# Patient Record
Sex: FEMALE | Race: BLACK OR AFRICAN AMERICAN | Employment: OTHER | URBAN - METROPOLITAN AREA
[De-identification: names, ages, dates, MRNs, and addresses within clinical notes are randomized per-mention and may not be internally consistent; named-entity substitution may affect disease eponyms.]

---

## 2018-11-13 ENCOUNTER — HOSPITAL ENCOUNTER (EMERGENCY)
Age: 83
Discharge: HOME | End: 2018-11-13
Attending: EMERGENCY MEDICINE
Payer: MEDICARE

## 2018-11-13 VITALS
OXYGEN SATURATION: 97 % | SYSTOLIC BLOOD PRESSURE: 139 MMHG | DIASTOLIC BLOOD PRESSURE: 70 MMHG | HEART RATE: 61 BPM | RESPIRATION RATE: 15 BRPM | WEIGHT: 176.59 LBS | BODY MASS INDEX: 32.5 KG/M2 | HEIGHT: 62 IN | TEMPERATURE: 97.9 F

## 2018-11-13 DIAGNOSIS — H15.002 SCLERITIS OF LEFT EYE: ICD-10-CM

## 2018-11-13 DIAGNOSIS — H57.12 ACUTE LEFT EYE PAIN: Primary | ICD-10-CM

## 2018-11-13 PROCEDURE — 74011000250 HC RX REV CODE- 250

## 2018-11-13 PROCEDURE — 99282 EMERGENCY DEPT VISIT SF MDM: CPT

## 2018-11-13 RX ORDER — TETRACAINE HYDROCHLORIDE 5 MG/ML
2 SOLUTION OPHTHALMIC
Status: COMPLETED | OUTPATIENT
Start: 2018-11-13 | End: 2018-11-13

## 2018-11-13 RX ORDER — TETRACAINE HYDROCHLORIDE 5 MG/ML
SOLUTION OPHTHALMIC
Status: COMPLETED
Start: 2018-11-13 | End: 2018-11-13

## 2018-11-13 RX ORDER — PREDNISONE 10 MG/1
TABLET ORAL
Qty: 21 TAB | Refills: 0 | Status: SHIPPED | OUTPATIENT
Start: 2018-11-13 | End: 2022-01-09 | Stop reason: ALTCHOICE

## 2018-11-13 RX ORDER — OXYCODONE AND ACETAMINOPHEN 5; 325 MG/1; MG/1
1 TABLET ORAL
Qty: 20 TAB | Refills: 0 | Status: SHIPPED | OUTPATIENT
Start: 2018-11-13 | End: 2021-06-25

## 2018-11-13 RX ORDER — GENTAMICIN SULFATE 3 MG/ML
2 SOLUTION/ DROPS OPHTHALMIC EVERY 4 HOURS
Qty: 5 ML | Refills: 0 | Status: SHIPPED | OUTPATIENT
Start: 2018-11-13 | End: 2018-11-20

## 2018-11-13 RX ADMIN — TETRACAINE HYDROCHLORIDE 2 DROP: 5 SOLUTION OPHTHALMIC at 07:22

## 2018-11-13 RX ADMIN — FLUORESCEIN SODIUM 1 STRIP: 0.6 STRIP OPHTHALMIC at 07:23

## 2018-11-13 NOTE — DISCHARGE INSTRUCTIONS
Scleritis: Care Instructions  Your Care Instructions    The sclera is the white of the eye. It is a fibrous covering that makes up much of the globe of the eye. The sclera protects the eye's inner parts. Scleritis means that the sclera is inflamed. The inflammation is what makes the white of the eye look red, or sometimes purple. Pain from scleritis is usually severe and is worse at night. The pain can spread to your face and jaw. Scleritis can make your eyes sensitive to light. It can also affect your vision. In more severe cases, it can lead to loss of vision. Scleritis is often caused by another medical problem, such as rheumatoid arthritis. Sometimes an eye infection is the cause. To find out if you have scleritis, your doctor checks your vision. He or she carefully examines your eyes. Your doctor also asks about other symptoms like joint pain or fevers. You may have tests and X-rays to look for medical problems that can cause scleritis. When scleritis is in the back of the eye, it can be harder to diagnose. Treatment focuses on reducing the inflammation. Your doctor may give you a nonsteroidal anti-inflammatory drug (NSAID). If the problem is severe, a steroid medicine may help. You may also need medicine to treat the cause, such as an antibiotic for infection or medicine for immune system problems. With treatment, scleritis can sometimes go away in a few weeks. But it can last longer, even years. Follow-up care is a key part of your treatment and safety. Be sure to make and go to all appointments, and call your doctor if you are having problems. It's also a good idea to know your test results and keep a list of the medicines you take. How can you care for yourself at home? · Be safe with medicines. Use your medicines exactly as prescribed. Call your doctor if you think you are having a problem with your medicine.   · If you are using eyedrops or ointment, be sure the dropper or bottle tip is clean. To put in eyedrops or ointment:  ? Tilt your head back, and pull your lower eyelid down with one finger. ? Drop or squirt the medicine inside the lower lid. ? Close your eye for 30 to 60 seconds to let the drops or ointment move around. ? Do not touch the ointment or dropper tip to your eyelashes or any other surface. · Do not wear contact lenses until your doctor tells you it's safe to use them again. · Wear sunglasses to help relieve pain from bright light. · See your doctor for checkups as often as scheduled. When should you call for help? Call your doctor now or seek immediate medical care if:    · You notice a loss of your vision.     · You have vision changes.     · You have new or worse eye pain.     · You have new or worse redness in your eye.    Watch closely for changes in your health, and be sure to contact your doctor if:    · You do not get better as expected. Where can you learn more? Go to http://helen-ebenezer.info/. Enter P368 in the search box to learn more about \"Scleritis: Care Instructions. \"  Current as of: December 3, 2017  Content Version: 11.8  © 4817-2471 [employer]. Care instructions adapted under license by [employer] (which disclaims liability or warranty for this information). If you have questions about a medical condition or this instruction, always ask your healthcare professional. [place] [address] any warranty or liability for your use of this information.

## 2018-11-13 NOTE — ED PROVIDER NOTES
The history is provided by the patient. Eye Swelling This is a new problem. Episode onset: last night - worse this morning. The left eye is affected. The injury mechanism is unknown. The pain is at a severity of 10/10. Associated symptoms include photophobia, eye redness and pain. Pertinent negatives include no nausea, no vomiting and no fever. She has tried nothing for the symptoms. Past Medical History:  
Diagnosis Date  Hyperlipidemia  Hypertension No past surgical history on file. No family history on file. Social History Socioeconomic History  Marital status:  Spouse name: Not on file  Number of children: Not on file  Years of education: Not on file  Highest education level: Not on file Social Needs  Financial resource strain: Not on file  Food insecurity - worry: Not on file  Food insecurity - inability: Not on file  Transportation needs - medical: Not on file  Transportation needs - non-medical: Not on file Occupational History  Not on file Tobacco Use  Smoking status: Not on file Substance and Sexual Activity  Alcohol use: Not on file  Drug use: Not on file  Sexual activity: Not on file Other Topics Concern  Not on file Social History Narrative  Not on file ALLERGIES: Patient has no known allergies. Review of Systems Constitutional: Negative for chills and fever. HENT: Positive for facial swelling. Negative for ear pain and sore throat. Eyes: Positive for photophobia, pain and redness. Gastrointestinal: Negative for nausea and vomiting. Neurological: Negative for headaches. Vitals:  
 11/13/18 9253 BP: 137/74 Pulse: 67 Resp: 18 Temp: 97.9 °F (36.6 °C) SpO2: 97% Weight: 80.1 kg (176 lb 9.4 oz) Height: 5' 2\" (1.575 m) Physical Exam  
Constitutional: She is oriented to person, place, and time. She appears well-developed and well-nourished.   
HENT:  
 Head: Normocephalic and atraumatic. Eyes: EOM are normal. Pupils are equal, round, and reactive to light. Right conjunctiva is not injected. Right conjunctiva has no hemorrhage. Left conjunctiva is injected. Left conjunctiva has no hemorrhage. Pulmonary/Chest: Effort normal. No respiratory distress. Neurological: She is alert and oriented to person, place, and time. Psychiatric: She has a normal mood and affect. Nursing note and vitals reviewed. MDM Number of Diagnoses or Management Options Acute left eye pain:  
Scleritis of left eye:  
Diagnosis management comments: Left eye pain - possible occult corneal abrasion vs conjunctivitis vs scleritis or episcleritis 0730 - plan is d/c to have patient see eye doctor as soon as possible, will treat with ABX eye drops, analgesics, and steroids for suspected scleritis Eye Stain 
 
Date/Time: 11/13/2018 7:30 AM 
 
Performed by: attending Corneal abrasion was not present on eyelid eversion. Cornea is clear. Anterior chamber is clear. Patient tolerance: Patient tolerated the procedure well with no immediate complications My total time at bedside, performing this procedure was 1-15 minutes. Comments: Extensive injection of the sclera

## 2018-11-13 NOTE — ED NOTES
Dr. Hal Reilly at bedside to perform ophthalmic examination. Patient in no distress. Left eye continues tearing and patient continues to complain of pain. Family at bedside. Lights dimmed for comfort.

## 2018-11-13 NOTE — ED TRIAGE NOTES
Patient states that she woke up with a swollen left eye. Patient states that she took 25 mg of Benadryl and put eye drops that she got from a previous episode of eye swelling in the other eye. Patient does not know if they were antibiotic drops. Patient states that she is light sensitive. Left eye appears red and is tearing up. Patient holding left eye during triage.

## 2021-06-25 ENCOUNTER — HOSPITAL ENCOUNTER (EMERGENCY)
Age: 86
Discharge: HOME | End: 2021-06-25
Attending: EMERGENCY MEDICINE
Payer: MEDICARE

## 2021-06-25 ENCOUNTER — APPOINTMENT (OUTPATIENT)
Dept: GENERAL RADIOLOGY | Age: 86
End: 2021-06-25
Attending: EMERGENCY MEDICINE
Payer: MEDICARE

## 2021-06-25 VITALS
OXYGEN SATURATION: 96 % | HEART RATE: 56 BPM | RESPIRATION RATE: 16 BRPM | DIASTOLIC BLOOD PRESSURE: 60 MMHG | TEMPERATURE: 96.9 F | SYSTOLIC BLOOD PRESSURE: 140 MMHG

## 2021-06-25 DIAGNOSIS — S93.409A SPRAIN OF ANKLE, UNSPECIFIED LATERALITY, UNSPECIFIED LIGAMENT, INITIAL ENCOUNTER: Primary | ICD-10-CM

## 2021-06-25 PROCEDURE — 99284 EMERGENCY DEPT VISIT MOD MDM: CPT

## 2021-06-25 PROCEDURE — 73610 X-RAY EXAM OF ANKLE: CPT

## 2021-06-25 NOTE — ED NOTES
Pt was discharged and given instructions by Dr Blaire Vasquez. Pt verbalized good understanding of all discharge instructions, and F/U care. All questions answered. Pt in stable condition on discharge. Pt wheeled to daughter in laws car.

## 2021-06-25 NOTE — ED PROVIDER NOTES
HPI the patient complains of left ankle pain. She thinks she injured it 2 days ago while stepping off a curb. She denies any other injury. Past Medical History:   Diagnosis Date    Hyperlipidemia     Hypertension        No past surgical history on file. History reviewed. No pertinent family history. Social History     Socioeconomic History    Marital status:      Spouse name: Not on file    Number of children: Not on file    Years of education: Not on file    Highest education level: Not on file   Occupational History    Not on file   Tobacco Use    Smoking status: Not on file   Substance and Sexual Activity    Alcohol use: Not on file    Drug use: Not on file    Sexual activity: Not on file   Other Topics Concern    Not on file   Social History Narrative    Not on file     Social Determinants of Health     Financial Resource Strain:     Difficulty of Paying Living Expenses:    Food Insecurity:     Worried About Running Out of Food in the Last Year:     [address] in the Last Year:    Transportation Needs:     Lack of Transportation (Medical):  Lack of Transportation (Non-Medical):    Physical Activity:     Days of Exercise per Week:     Minutes of Exercise per Session:    Stress:     Feeling of Stress :    Social Connections:     Frequency of Communication with Friends and Family:     Frequency of Social Gatherings with Friends and Family:     Attends Sikhism Services:     Active Member of Clubs or Organizations:     Attends Club or Organization Meetings:     Marital Status:    Intimate Partner Violence:     Fear of Current or Ex-Partner:     Emotionally Abused:     Physically Abused:     Sexually Abused: ALLERGIES: Patient has no known allergies. Review of Systems   Musculoskeletal: Positive for arthralgias. There were no vitals filed for this visit. Physical Exam  Constitutional:       General: She is not in acute distress. Appearance: She is well-developed. HENT:      Head: Normocephalic. Cardiovascular:      Rate and Rhythm: Normal rate. Pulmonary:      Effort: Pulmonary effort is normal.   Musculoskeletal:         General: Normal range of motion. Cervical back: Normal range of motion. Comments: There is mild swelling and tenderness of the lateral malleolus. The medial malleolus/foot/leg are all nontender. Distal pulses intact. Skin:     General: Skin is warm and dry. Capillary Refill: Capillary refill takes less than 2 seconds. Neurological:      Mental Status: She is alert.    Psychiatric:         Behavior: Behavior normal.          MDM       Procedures

## 2021-06-25 NOTE — ED TRIAGE NOTES
Pt was wheeled to the treatment area. Pt states \"My left ankle has been hurting since I hurt it a few days ago. It hurts more at night when I lay down. \" No bruising no swelling at this time.

## 2021-11-09 ENCOUNTER — TRANSCRIBE ORDER (OUTPATIENT)
Dept: SCHEDULING | Age: 86
End: 2021-11-09

## 2021-11-09 DIAGNOSIS — M54.16 LUMBAR RADICULOPATHY: Primary | ICD-10-CM

## 2021-11-19 ENCOUNTER — HOSPITAL ENCOUNTER (OUTPATIENT)
Dept: MRI IMAGING | Age: 86
Discharge: HOME | End: 2021-11-19
Attending: FAMILY MEDICINE
Payer: MEDICARE

## 2021-11-19 DIAGNOSIS — M54.16 LUMBAR RADICULOPATHY: ICD-10-CM

## 2021-11-19 PROCEDURE — 72148 MRI LUMBAR SPINE W/O DYE: CPT

## 2022-01-09 ENCOUNTER — APPOINTMENT (OUTPATIENT)
Dept: GENERAL RADIOLOGY | Age: 87
End: 2022-01-09
Attending: EMERGENCY MEDICINE
Payer: MEDICARE

## 2022-01-09 ENCOUNTER — HOSPITAL ENCOUNTER (EMERGENCY)
Age: 87
Discharge: HOME | End: 2022-01-09
Attending: EMERGENCY MEDICINE
Payer: MEDICARE

## 2022-01-09 VITALS
WEIGHT: 162 LBS | TEMPERATURE: 99.2 F | HEART RATE: 67 BPM | DIASTOLIC BLOOD PRESSURE: 75 MMHG | SYSTOLIC BLOOD PRESSURE: 163 MMHG | OXYGEN SATURATION: 99 % | BODY MASS INDEX: 27.66 KG/M2 | HEIGHT: 64 IN | RESPIRATION RATE: 20 BRPM

## 2022-01-09 DIAGNOSIS — M54.32 SCIATICA OF LEFT SIDE: Primary | ICD-10-CM

## 2022-01-09 PROCEDURE — 99284 EMERGENCY DEPT VISIT MOD MDM: CPT

## 2022-01-09 PROCEDURE — 74011250637 HC RX REV CODE- 250/637: Performed by: EMERGENCY MEDICINE

## 2022-01-09 PROCEDURE — 72100 X-RAY EXAM L-S SPINE 2/3 VWS: CPT

## 2022-01-09 RX ORDER — PREDNISONE 20 MG/1
TABLET ORAL
Qty: 20 TABLET | Refills: 0 | Status: SHIPPED | OUTPATIENT
Start: 2022-01-09

## 2022-01-09 RX ORDER — HYDROCODONE BITARTRATE AND ACETAMINOPHEN 5; 325 MG/1; MG/1
1 TABLET ORAL
Qty: 12 TABLET | Refills: 0 | Status: SHIPPED | OUTPATIENT
Start: 2022-01-09 | End: 2022-01-12

## 2022-01-09 RX ORDER — HYDROCODONE BITARTRATE AND ACETAMINOPHEN 5; 325 MG/1; MG/1
1 TABLET ORAL
Status: COMPLETED | OUTPATIENT
Start: 2022-01-09 | End: 2022-01-09

## 2022-01-09 RX ADMIN — HYDROCODONE BITARTRATE AND ACETAMINOPHEN 1 TABLET: 5; 325 TABLET ORAL at 13:34

## 2022-01-09 NOTE — ED PROVIDER NOTES
HPI the patient has a 2-day history of left-sided low back pain that radiates into her left hip and thigh. She has used Tylenol with minimal relief. She has a history of spinal stenosis, but says it is never flared up this bad. She is ambulatory with a assistance and a walker. She denies having abdominal pain, bowel/bladder symptoms, leg weakness or other complaints. Past Medical History:   Diagnosis Date    Hyperlipidemia     Hypertension        No past surgical history on file. No family history on file. Social History     Socioeconomic History    Marital status:      Spouse name: Not on file    Number of children: Not on file    Years of education: Not on file    Highest education level: Not on file   Occupational History    Not on file   Tobacco Use    Smoking status: Not on file    Smokeless tobacco: Not on file   Substance and Sexual Activity    Alcohol use: Not on file    Drug use: Not on file    Sexual activity: Not on file   Other Topics Concern    Not on file   Social History Narrative    Not on file     Social Determinants of Health     Financial Resource Strain:     Difficulty of Paying Living Expenses: Not on file   Food Insecurity:     Worried About Running Out of Food in the Last Year: Not on file    [place] of Food in the Last Year: Not on file   Transportation Needs:     Lack of Transportation (Medical): Not on file    Lack of Transportation (Non-Medical):  Not on file   Physical Activity:     Days of Exercise per Week: Not on file    Minutes of Exercise per Session: Not on file   Stress:     Feeling of Stress : Not on file   Social Connections:     Frequency of Communication with Friends and Family: Not on file    Frequency of Social Gatherings with Friends and Family: Not on file    Attends Caodaism Services: Not on file    Active Member of Clubs or Organizations: Not on file    Attends Club or Organization Meetings: Not on file    Marital Status: Not on file   Intimate Partner Violence:     Fear of Current or Ex-Partner: Not on file    Emotionally Abused: Not on file    Physically Abused: Not on file    Sexually Abused: Not on file   Housing Stability:     Unable to Pay for Housing in the Last Year: Not on file    Number of [place] in the Last Year: Not on file    Unstable Housing in the Last Year: Not on file         ALLERGIES: Patient has no known allergies. Review of Systems   Constitutional: Negative for fever. Eyes: Negative for visual disturbance. Respiratory: Negative for cough and shortness of breath. Cardiovascular: Negative for chest pain. Gastrointestinal: Negative for abdominal pain, nausea and vomiting. Genitourinary: Negative for difficulty urinating. Musculoskeletal: Positive for back pain and myalgias. Skin: Negative for rash. Neurological: Negative for weakness and headaches. Psychiatric/Behavioral: Negative for confusion. There were no vitals filed for this visit. Physical Exam  Constitutional:       General: She is not in acute distress. Appearance: She is well-developed. HENT:      Head: Normocephalic. Cardiovascular:      Rate and Rhythm: Normal rate. Pulmonary:      Effort: Pulmonary effort is normal.      Breath sounds: Normal breath sounds. Abdominal:      Palpations: Abdomen is soft. Tenderness: There is no abdominal tenderness. Musculoskeletal:         General: Normal range of motion. Cervical back: Normal range of motion. Comments: The left leg has normal distal pulses. No motor weakness is noted. The back is nontender to palpation and she localizes the pain to the lower left lumbar area. Skin:     General: Skin is warm and dry. Capillary Refill: Capillary refill takes less than 2 seconds. Neurological:      Mental Status: She is alert.    Psychiatric:         Behavior: Behavior normal.          MDM       Procedures

## 2022-01-09 NOTE — ED TRIAGE NOTES
Pt presents via EMS with c/o left lower back, left hip and left thigh pain for the past 2 days. Able to ambulate using her walker but needing additional assistance. Denies fal or injury. Denies worsening numbness/tingling in  LLE.

## 2022-01-09 NOTE — ED NOTES
Pt is discharged pt daughter called given discharge information of prescriptions test results and follow up care needed. Pt daughter Cristela Perla verbalized good understanding and stated she or her brother would be on the way to  her mother.

## 2022-06-03 ENCOUNTER — TRANSCRIBE ORDER (OUTPATIENT)
Dept: SCHEDULING | Age: 87
End: 2022-06-03

## 2022-06-03 DIAGNOSIS — M79.605 LEFT LEG PAIN: Primary | ICD-10-CM

## 2024-03-28 ENCOUNTER — OFFICE VISIT (OUTPATIENT)
Age: 89
End: 2024-03-28
Payer: MEDICARE

## 2024-03-28 VITALS
DIASTOLIC BLOOD PRESSURE: 70 MMHG | SYSTOLIC BLOOD PRESSURE: 147 MMHG | HEART RATE: 58 BPM | WEIGHT: 170 LBS | OXYGEN SATURATION: 97 % | RESPIRATION RATE: 16 BRPM | BODY MASS INDEX: 29.18 KG/M2

## 2024-03-28 DIAGNOSIS — R41.3 MEMORY DIFFICULTIES: Primary | ICD-10-CM

## 2024-03-28 PROCEDURE — 99204 OFFICE O/P NEW MOD 45 MIN: CPT | Performed by: PSYCHIATRY & NEUROLOGY

## 2024-03-28 PROCEDURE — G8419 CALC BMI OUT NRM PARAM NOF/U: HCPCS | Performed by: PSYCHIATRY & NEUROLOGY

## 2024-03-28 PROCEDURE — 96138 PSYCL/NRPSYC TECH 1ST: CPT | Performed by: PSYCHIATRY & NEUROLOGY

## 2024-03-28 PROCEDURE — 96132 NRPSYC TST EVAL PHYS/QHP 1ST: CPT | Performed by: PSYCHIATRY & NEUROLOGY

## 2024-03-28 PROCEDURE — 1123F ACP DISCUSS/DSCN MKR DOCD: CPT | Performed by: PSYCHIATRY & NEUROLOGY

## 2024-03-28 PROCEDURE — G8428 CUR MEDS NOT DOCUMENT: HCPCS | Performed by: PSYCHIATRY & NEUROLOGY

## 2024-03-28 PROCEDURE — 1036F TOBACCO NON-USER: CPT | Performed by: PSYCHIATRY & NEUROLOGY

## 2024-03-28 PROCEDURE — 1090F PRES/ABSN URINE INCON ASSESS: CPT | Performed by: PSYCHIATRY & NEUROLOGY

## 2024-03-28 PROCEDURE — G8484 FLU IMMUNIZE NO ADMIN: HCPCS | Performed by: PSYCHIATRY & NEUROLOGY

## 2024-03-28 RX ORDER — AMLODIPINE BESYLATE 5 MG/1
5 TABLET ORAL DAILY
COMMUNITY

## 2024-03-28 RX ORDER — PROPRANOLOL HCL 60 MG
60 CAPSULE, EXTENDED RELEASE 24HR ORAL DAILY
COMMUNITY

## 2024-03-28 RX ORDER — MEMANTINE HYDROCHLORIDE 5 MG/1
5 TABLET ORAL 2 TIMES DAILY
Qty: 180 TABLET | Refills: 1 | Status: SHIPPED | OUTPATIENT
Start: 2024-03-28

## 2024-03-28 ASSESSMENT — PATIENT HEALTH QUESTIONNAIRE - PHQ9
SUM OF ALL RESPONSES TO PHQ9 QUESTIONS 1 & 2: 0
SUM OF ALL RESPONSES TO PHQ QUESTIONS 1-9: 0
1. LITTLE INTEREST OR PLEASURE IN DOING THINGS: NOT AT ALL
2. FEELING DOWN, DEPRESSED OR HOPELESS: NOT AT ALL

## 2024-03-28 NOTE — PROGRESS NOTES
[facility] at [facility] at [address] [place] [address] [address] [place] [address] [phone] Office  [phone] Facsimile           Referring: Jammie Shook MD      Chief Complaint   Patient presents with    New Patient     Presents with daughter in law today Bhumi    Memory Loss     Cognitive workup     89-year-old lady presents today accompanied by her daughter-in-law for initial neurologic consultation regarding progressive difficulty with memory.  The patient tells me she thinks she is completely fine for someone of her age.  She might be a bit forgetful.  She may forget to make her bed but then she will recall by the end of the day.  She may forget her medication but then she will recall.  She has no difficulty with bills.  She lives alone.  She does not think she repeats anything and that she did not hear people properly and she will ask a question again.  Her daughter-in-law is a bit reserved today but she does note that the patient will forget to take her meds at times.  She will also repeat herself and that has been increasing.      Past Medical History:   Diagnosis Date    Colon cancer (HCC)     Hyperlipidemia     Hypertension     Rheumatic fever        Past Surgical History:   Procedure Laterality Date    COLON SURGERY         Current Outpatient Medications   Medication Sig Dispense Refill    amLODIPine (NORVASC) 5 MG tablet Take 1 tablet by mouth daily      propranolol (INDERAL LA) 60 MG extended release capsule Take 1 capsule by mouth daily       No current facility-administered medications for this visit.        No Known Allergies    Social History     Tobacco Use    Smoking status: Never    Smokeless tobacco: Never   Vaping Use    Vaping Use: Never used   Substance Use Topics    Alcohol use: Never       Family History   Problem Relation Age

## 2024-03-28 NOTE — PATIENT INSTRUCTIONS
[place], [place] [facility] Test Result Communication    Test results are available in [employer].  [employer] is the patient portal into our electronic health record.  This feature allows patients to see diagnostic test results, immunizations, allergies, past medical and surgical history, current medications, and send messages directly to providers.  Our team members at the  can provide additional information and assist with registration.  The [employer] support team can be reached at [phone].    In some cases, a provider might need time to explain the results in detail during a follow-up appointment.  This might include additional information or context that will help patients understand the reason for next steps in the plan of care recommended by their provider.    If a patient chooses to receive diagnostic testing at an imaging center outside of the [facility] network, it is the patient's responsibility to bring the imaging report and disc to their [facility] follow-up appointment.    If the test results reveal anything that is particularly noteworthy, we will contact you to discuss the matter and, if necessary, schedule a follow-up appointment at an earlier date.    If you have not received your test results by [employer] or other communication within 7 days, please contact our office.  An inquiry can be sent to your provider using [employer].  Alternatively, appointments can be scheduled via telephone to review results.  If a follow-up appointment to review results has not been scheduled, Our [facility] office can be reached at [phone].  For appointments at our [place] or [place] office, please call [phone].       PRESCRIPTION REFILL POLICY    [facility]   Statement to Patients  April 1, 2014      In an effort to ensure the large volume of patient prescription refills is processed in the most efficient and expeditious

## 2024-03-28 NOTE — PROGRESS NOTES
07999 (16 minute minimum)  _20_ minutes were spent administering cognitive testing by medical assistant/nurse.    Cognitive Testing Evaluation     Introduction:     JASS MONZON  1933-12-25  Female   This 89 year old Female was administered a battery of neurocognitive testing on 03/28/2024.     Tests Administered:     Trails A, Trails B, Digit Symbol Substitution, Stroop, Immediate Recognition, Delayed Recognition   The combined test administration time was 17 minutes   Test Results:   Cognitive testing was provided via a battery of cognitive assessments. The pattern of test scores indicate that results are valid.  A Clinical Report with further description of scores and results is also available.   Overall: Patient tested in the 1st* percentile (standard score of 32*).   Trails A: Patient tested in the 1st percentile (scaled standard score of 44).  Trails B: Patient tested in the --* percentile (scaled standard score of null).  Digit Symbol Substitution: Patient tested in the 1st percentile (scaled standard score of 2).  Stroop: Patient tested in the 11th percentile (scaled standard score of 81).  Immediate Recognition: Patient tested in the 1st percentile (scaled standard score of 17).  Delayed Recognition: Patient tested in the 1st percentile (scaled standard score of 52).   * These assessments were not scored because they were potentially invalid, or the patient failed to complete in the allotted time.   Interpretation of Test Scores:     Examination of individual component tests shows:   Attention - Trails A: likely impairment  Mental Flexibility - Trails B: possible impairment  Executive Function - Digit Symbol Substitution: likely impairment  Executive Function - Stroop: possible impairment  Memory - Immediate Recognition: likely impairment  Memory - Delayed Recognition: likely impairment    The patient’s overall cognitive test performance was in the 1st percentile when compared to individuals of a similar

## 2024-06-21 ENCOUNTER — TRANSCRIBE ORDERS (OUTPATIENT)
Facility: HOSPITAL | Age: 89
End: 2024-06-21

## 2024-06-21 ENCOUNTER — HOSPITAL ENCOUNTER (OUTPATIENT)
Dept: VASCULAR SURGERY | Facility: HOSPITAL | Age: 89
End: 2024-06-21
Payer: MEDICARE

## 2024-06-21 DIAGNOSIS — M79.604 RIGHT LEG PAIN: ICD-10-CM

## 2024-06-21 DIAGNOSIS — M79.604 RIGHT LEG PAIN: Primary | ICD-10-CM

## 2024-06-21 PROCEDURE — 93971 EXTREMITY STUDY: CPT

## 2024-10-21 RX ORDER — MEMANTINE HYDROCHLORIDE 5 MG/1
5 TABLET ORAL 2 TIMES DAILY
Qty: 180 TABLET | Refills: 0 | Status: SHIPPED | OUTPATIENT
Start: 2024-10-21

## 2024-11-04 ENCOUNTER — TELEPHONE (OUTPATIENT)
Age: 89
End: 2024-11-04

## 2024-11-04 DIAGNOSIS — R41.3 MEMORY DIFFICULTIES: Primary | ICD-10-CM

## 2024-11-04 NOTE — TELEPHONE ENCOUNTER
Patients daughter in law came in office to drop off DMV forms asking for Dr. Brown to fill them out. States they are due by 11/30. Placed in Dr. Brown box.

## 2024-11-06 ENCOUNTER — TELEPHONE (OUTPATIENT)
Age: 88
End: 2024-11-06

## 2024-11-06 NOTE — TELEPHONE ENCOUNTER
HIPAA Verified (if caller is someone other than patient):        Reason for Call: NP appointment     If calling to cancel, does patient want to reschedule?   no    Is this call related to results?   no    If yes, please let patient know they must wait for their follow up / feedback appointment to discuss.  They can, however,  a copy of the results at the clinic 2-3 weeks after their testing appt.     Is this a New Patient Appointment Request?   yes    If yes:   Requested Provider (choose all that apply - patient doesn't need to call ALL locations):   Ervin    Referred By:  Dr. Brown    Referral in chart?   Yes / but it looks like it      Patient's Insurance Carrier (please ensure you gather insurance information):   Yes      Additional notes / reason for call:   Mmeory concerns      **Please advise callers that it could take up to 5 business days for a return call**        Message: (any additional details from patient/caller not covered above)          Level 1 Calls - attempted to reach practice? no     Reason Call Marked High Priority (if applicable):

## 2024-11-07 NOTE — TELEPHONE ENCOUNTER
Returned call.  We are not able to fill out DMV form as it has been greater than 6 mo since her OV.  She will need her neurocog testing and formal driving eval.  Gave numbers to [facility] and [facility] for neurocog testing,  for 's eval.  Order have been faxed to all three

## 2025-02-26 DIAGNOSIS — R41.3 MEMORY DIFFICULTIES: Primary | ICD-10-CM

## 2025-02-26 RX ORDER — MEMANTINE HYDROCHLORIDE 5 MG/1
5 TABLET ORAL 2 TIMES DAILY
Qty: 180 TABLET | Refills: 0 | Status: SHIPPED | OUTPATIENT
Start: 2025-02-26

## 2025-06-10 DIAGNOSIS — R41.3 MEMORY DIFFICULTIES: ICD-10-CM

## 2025-06-11 RX ORDER — MEMANTINE HYDROCHLORIDE 5 MG/1
5 TABLET ORAL 2 TIMES DAILY
Qty: 180 TABLET | Refills: 0 | OUTPATIENT
Start: 2025-06-11

## 2025-06-26 DIAGNOSIS — R41.3 MEMORY DIFFICULTIES: ICD-10-CM

## 2025-06-27 RX ORDER — MEMANTINE HYDROCHLORIDE 5 MG/1
5 TABLET ORAL 2 TIMES DAILY
Qty: 60 TABLET | Refills: 0 | Status: SHIPPED | OUTPATIENT
Start: 2025-06-27

## 2025-07-22 DIAGNOSIS — R41.3 MEMORY DIFFICULTIES: ICD-10-CM

## 2025-07-24 RX ORDER — MEMANTINE HYDROCHLORIDE 5 MG/1
5 TABLET ORAL 2 TIMES DAILY
Qty: 60 TABLET | Refills: 5 | Status: SHIPPED | OUTPATIENT
Start: 2025-07-24